# Patient Record
Sex: FEMALE | Race: WHITE
[De-identification: names, ages, dates, MRNs, and addresses within clinical notes are randomized per-mention and may not be internally consistent; named-entity substitution may affect disease eponyms.]

---

## 2020-02-20 ENCOUNTER — HOSPITAL ENCOUNTER (OUTPATIENT)
Dept: HOSPITAL 92 - BICULT | Age: 25
Discharge: HOME | End: 2020-02-20
Attending: NURSE PRACTITIONER
Payer: COMMERCIAL

## 2020-02-20 DIAGNOSIS — Z3A.20: ICD-10-CM

## 2020-02-20 DIAGNOSIS — Z34.02: Primary | ICD-10-CM

## 2020-02-20 PROCEDURE — 76805 OB US >/= 14 WKS SNGL FETUS: CPT

## 2020-02-20 NOTE — ULT
EXAM:

OB ultrasound



COMPARISON:

None



HISTORY:

Pregnant female patient. Evaluate fetal anatomy and cervical length.



TECHNIQUE: Multiplanar grayscale and color Doppler transabdominal sonographic images are obtained.



FINDINGS: There is a single intrauterine gestation in variable presentation. Cardiac Doppler demonstr
ates fetal heart tones with a fetal heart rate of 144 beats per minute. The placenta is located

posteriorly without evidence of placenta previa. There is a normal amount of amniotic fluid with an a
mniotic fluid index of 13 centimeters. The cervical length based on transabdominal imaging measures

3.1 centimeters.



Fetal biometry measurements:

BPD  4.62  cm -- 20 weeks

HC  17.31  cm -- 20 weeks

AC  15.66  cm -- 20 weeks 6 days

FL  3.21  cm -- 20 weeks



The estimated gestational age by ultrasound is 20 weeks 2 days with an EMANI on7/7/2020. Gestational ag
e by the last menstrual period is 20 weeks 2 days.



The estimated fetal weight by ultrasound is 349 g (12 ounces). This represents 50 percentile for feta
l weight.



A 4 chambered heart is visualized. The cerebellum, visualized portions of the fetal spine, kidneys, u
rinary bladder, and cord insertion demonstrate a normal sonographic appearance. The nose and lips

are not well delineated on this exam due to fetal positioning.

A three-vessel cord is not visualized, but there is flow on either side of the urinary bladder sugges
ting a three-vessel cord.. No fetal anomalies are seen.



IMPRESSION:

1. Single intrauterine gestation in variable presentation with fetal heart tones documented. Estimate
d gestational age by ultrasound is 20 weeks 2 days.

2. Estimated fetal weight is 349 g per densities 12 ounces).

3. Amniotic fluid index is 13 centimeters.



Reported By: Chucho Reyes 

Electronically Signed:  2/20/2020 4:08 PM

## 2020-07-02 ENCOUNTER — HOSPITAL ENCOUNTER (OUTPATIENT)
Dept: HOSPITAL 92 - LABBT | Age: 25
Discharge: HOME | End: 2020-07-02
Attending: OBSTETRICS & GYNECOLOGY
Payer: COMMERCIAL

## 2020-07-02 DIAGNOSIS — Z01.812: Primary | ICD-10-CM

## 2020-07-02 DIAGNOSIS — Z11.59: ICD-10-CM

## 2020-07-02 PROCEDURE — U0003 INFECTIOUS AGENT DETECTION BY NUCLEIC ACID (DNA OR RNA); SEVERE ACUTE RESPIRATORY SYNDROME CORONAVIRUS 2 (SARS-COV-2) (CORONAVIRUS DISEASE [COVID-19]), AMPLIFIED PROBE TECHNIQUE, MAKING USE OF HIGH THROUGHPUT TECHNOLOGIES AS DESCRIBED BY CMS-2020-01-R: HCPCS

## 2020-07-02 PROCEDURE — 87635 SARS-COV-2 COVID-19 AMP PRB: CPT

## 2022-09-19 ENCOUNTER — HOSPITAL ENCOUNTER (EMERGENCY)
Dept: HOSPITAL 9 - MADERS | Age: 27
Discharge: HOME | End: 2022-09-19
Payer: COMMERCIAL

## 2022-09-19 DIAGNOSIS — E03.9: ICD-10-CM

## 2022-09-19 DIAGNOSIS — Z3A.01: ICD-10-CM

## 2022-09-19 DIAGNOSIS — O21.0: Primary | ICD-10-CM

## 2022-09-19 DIAGNOSIS — O99.281: ICD-10-CM

## 2022-09-19 PROCEDURE — 99283 EMERGENCY DEPT VISIT LOW MDM: CPT

## 2022-11-28 ENCOUNTER — HOSPITAL ENCOUNTER (EMERGENCY)
Dept: HOSPITAL 9 - MADERS | Age: 27
LOS: 1 days | Discharge: ANOTHER HOSPITAL | End: 2022-11-29
Payer: COMMERCIAL

## 2022-11-28 DIAGNOSIS — R82.4: ICD-10-CM

## 2022-11-28 DIAGNOSIS — R10.10: ICD-10-CM

## 2022-11-28 DIAGNOSIS — E03.9: ICD-10-CM

## 2022-11-28 DIAGNOSIS — Z3A.17: ICD-10-CM

## 2022-11-28 DIAGNOSIS — Z79.899: ICD-10-CM

## 2022-11-28 DIAGNOSIS — Z20.822: ICD-10-CM

## 2022-11-28 DIAGNOSIS — O99.891: Primary | ICD-10-CM

## 2022-11-28 DIAGNOSIS — E86.0: ICD-10-CM

## 2022-11-28 DIAGNOSIS — O99.282: ICD-10-CM

## 2022-11-28 LAB
ALBUMIN SERPL BCG-MCNC: 3.9 G/DL (ref 3.5–5)
ALP SERPL-CCNC: 38 U/L (ref 40–110)
ALT SERPL W P-5'-P-CCNC: 13 U/L (ref 8–55)
ANION GAP SERPL CALC-SCNC: 15 MMOL/L (ref 10–20)
AST SERPL-CCNC: 12 U/L (ref 5–34)
BASOPHILS # BLD AUTO: 0.1 THOU/UL (ref 0–0.2)
BASOPHILS NFR BLD AUTO: 0.9 % (ref 0–1)
BILIRUB SERPL-MCNC: 0.4 MG/DL (ref 0.2–1.2)
BUN SERPL-MCNC: 8 MG/DL (ref 7–18.7)
CALCIUM SERPL-MCNC: 9.4 MG/DL (ref 7.8–10.44)
CHLORIDE SERPL-SCNC: 105 MMOL/L (ref 98–107)
CO2 SERPL-SCNC: 20 MMOL/L (ref 22–29)
CREAT CL PREDICTED SERPL C-G-VRATE: 0 ML/MIN (ref 70–130)
EOSINOPHIL # BLD AUTO: 0.1 THOU/UL (ref 0–0.7)
EOSINOPHIL NFR BLD AUTO: 1.2 % (ref 0–10)
GLOBULIN SER CALC-MCNC: 3.2 G/DL (ref 2.4–3.5)
GLUCOSE SERPL-MCNC: 93 MG/DL (ref 70–105)
HGB BLD-MCNC: 12.4 G/DL (ref 12–16)
LIPASE SERPL-CCNC: 28 U/L (ref 8–78)
LYMPHOCYTES # BLD AUTO: 1.6 THOU/UL (ref 1.2–3.4)
LYMPHOCYTES NFR BLD AUTO: 18.3 % (ref 21–51)
MCH RBC QN AUTO: 32.1 PG (ref 27–31)
MCV RBC AUTO: 92 FL (ref 78–98)
MONOCYTES # BLD AUTO: 0.5 THOU/UL (ref 0.11–0.59)
MONOCYTES NFR BLD AUTO: 5 % (ref 0–10)
NEUTROPHILS # BLD AUTO: 6.7 THOU/UL (ref 1.4–6.5)
NEUTROPHILS NFR BLD AUTO: 74.6 % (ref 42–75)
PLATELET # BLD AUTO: 214 10X3/UL (ref 130–400)
POTASSIUM SERPL-SCNC: 3.6 MMOL/L (ref 3.5–5.1)
RBC # BLD AUTO: 3.87 MILL/UL (ref 4.2–5.4)
SODIUM SERPL-SCNC: 136 MMOL/L (ref 136–145)
WBC # BLD AUTO: 9 10X3/UL (ref 4.8–10.8)

## 2022-11-28 PROCEDURE — 96374 THER/PROPH/DIAG INJ IV PUSH: CPT

## 2022-11-28 PROCEDURE — 83605 ASSAY OF LACTIC ACID: CPT

## 2022-11-28 PROCEDURE — S0028 INJECTION, FAMOTIDINE, 20 MG: HCPCS

## 2022-11-28 PROCEDURE — 96361 HYDRATE IV INFUSION ADD-ON: CPT

## 2022-11-28 PROCEDURE — 93005 ELECTROCARDIOGRAM TRACING: CPT

## 2022-11-28 PROCEDURE — 94760 N-INVAS EAR/PLS OXIMETRY 1: CPT

## 2022-11-28 PROCEDURE — 96375 TX/PRO/DX INJ NEW DRUG ADDON: CPT

## 2022-11-28 PROCEDURE — 96376 TX/PRO/DX INJ SAME DRUG ADON: CPT

## 2022-11-28 PROCEDURE — 83690 ASSAY OF LIPASE: CPT

## 2022-11-28 PROCEDURE — 80053 COMPREHEN METABOLIC PANEL: CPT

## 2022-11-28 PROCEDURE — 81003 URINALYSIS AUTO W/O SCOPE: CPT

## 2022-11-28 PROCEDURE — 85025 COMPLETE CBC W/AUTO DIFF WBC: CPT

## 2022-11-29 ENCOUNTER — HOSPITAL ENCOUNTER (OUTPATIENT)
Dept: HOSPITAL 92 - CSHERS | Age: 27
Setting detail: OBSERVATION
LOS: 1 days | Discharge: HOME | End: 2022-11-30
Attending: SURGERY | Admitting: SURGERY
Payer: COMMERCIAL

## 2022-11-29 VITALS — BODY MASS INDEX: 26.2 KG/M2

## 2022-11-29 DIAGNOSIS — Z79.899: ICD-10-CM

## 2022-11-29 DIAGNOSIS — Z3A.17: ICD-10-CM

## 2022-11-29 DIAGNOSIS — E06.3: ICD-10-CM

## 2022-11-29 DIAGNOSIS — K80.10: ICD-10-CM

## 2022-11-29 DIAGNOSIS — E89.0: ICD-10-CM

## 2022-11-29 DIAGNOSIS — O99.612: Primary | ICD-10-CM

## 2022-11-29 DIAGNOSIS — Z28.310: ICD-10-CM

## 2022-11-29 LAB — SP GR UR STRIP: 1.01 (ref 1–1.03)

## 2022-11-29 PROCEDURE — S0020 INJECTION, BUPIVICAINE HYDRO: HCPCS

## 2022-11-29 PROCEDURE — G0378 HOSPITAL OBSERVATION PER HR: HCPCS

## 2022-11-29 PROCEDURE — 0FT44ZZ RESECTION OF GALLBLADDER, PERCUTANEOUS ENDOSCOPIC APPROACH: ICD-10-PCS | Performed by: SURGERY

## 2022-11-29 PROCEDURE — 76705 ECHO EXAM OF ABDOMEN: CPT

## 2022-11-29 PROCEDURE — 94760 N-INVAS EAR/PLS OXIMETRY 1: CPT

## 2022-11-29 PROCEDURE — C1889 IMPLANT/INSERT DEVICE, NOC: HCPCS

## 2022-11-29 PROCEDURE — 88304 TISSUE EXAM BY PATHOLOGIST: CPT

## 2022-11-29 PROCEDURE — 96375 TX/PRO/DX INJ NEW DRUG ADDON: CPT

## 2022-11-29 PROCEDURE — S0028 INJECTION, FAMOTIDINE, 20 MG: HCPCS

## 2022-11-29 RX ADMIN — FAMOTIDINE SCH MG: 10 INJECTION, SOLUTION INTRAVENOUS at 21:19

## 2022-11-29 RX ADMIN — HYDROCODONE BITARTRATE AND ACETAMINOPHEN PRN TAB: 5; 325 TABLET ORAL at 21:08

## 2022-11-29 RX ADMIN — POTASSIUM CHLORIDE, DEXTROSE MONOHYDRATE AND SODIUM CHLORIDE SCH MLS: 150; 5; 450 INJECTION, SOLUTION INTRAVENOUS at 13:00

## 2022-11-30 VITALS — TEMPERATURE: 98.1 F | DIASTOLIC BLOOD PRESSURE: 71 MMHG | SYSTOLIC BLOOD PRESSURE: 107 MMHG

## 2022-11-30 RX ADMIN — HYDROCODONE BITARTRATE AND ACETAMINOPHEN PRN TAB: 5; 325 TABLET ORAL at 06:10

## 2022-11-30 RX ADMIN — FAMOTIDINE SCH: 10 INJECTION, SOLUTION INTRAVENOUS at 10:31

## 2022-11-30 RX ADMIN — POTASSIUM CHLORIDE, DEXTROSE MONOHYDRATE AND SODIUM CHLORIDE SCH: 150; 5; 450 INJECTION, SOLUTION INTRAVENOUS at 07:58

## 2022-12-29 ENCOUNTER — HOSPITAL ENCOUNTER (OUTPATIENT)
Dept: HOSPITAL 92 - CSHULT | Age: 27
Discharge: HOME | End: 2022-12-29
Attending: OBSTETRICS & GYNECOLOGY
Payer: COMMERCIAL

## 2022-12-29 DIAGNOSIS — Z3A.21: ICD-10-CM

## 2022-12-29 DIAGNOSIS — Z34.82: Primary | ICD-10-CM

## 2022-12-29 PROCEDURE — 76805 OB US >/= 14 WKS SNGL FETUS: CPT
